# Patient Record
Sex: FEMALE | Race: WHITE | NOT HISPANIC OR LATINO
[De-identification: names, ages, dates, MRNs, and addresses within clinical notes are randomized per-mention and may not be internally consistent; named-entity substitution may affect disease eponyms.]

---

## 2017-06-20 ENCOUNTER — APPOINTMENT (OUTPATIENT)
Dept: MRI IMAGING | Facility: CLINIC | Age: 22
End: 2017-06-20

## 2017-06-20 ENCOUNTER — OUTPATIENT (OUTPATIENT)
Dept: OUTPATIENT SERVICES | Facility: HOSPITAL | Age: 22
LOS: 1 days | End: 2017-06-20
Payer: COMMERCIAL

## 2017-06-20 DIAGNOSIS — Z00.8 ENCOUNTER FOR OTHER GENERAL EXAMINATION: ICD-10-CM

## 2017-06-20 PROCEDURE — 72148 MRI LUMBAR SPINE W/O DYE: CPT

## 2017-07-22 ENCOUNTER — APPOINTMENT (OUTPATIENT)
Dept: ORTHOPEDIC SURGERY | Facility: CLINIC | Age: 22
End: 2017-07-22

## 2017-07-22 DIAGNOSIS — M51.36 OTHER INTERVERTEBRAL DISC DEGENERATION, LUMBAR REGION: ICD-10-CM

## 2018-05-03 ENCOUNTER — HOSPITAL ENCOUNTER (EMERGENCY)
Dept: HOSPITAL 25 - UCCORT | Age: 23
Discharge: HOME | End: 2018-05-03
Payer: COMMERCIAL

## 2018-05-03 DIAGNOSIS — K64.4: Primary | ICD-10-CM

## 2018-05-03 DIAGNOSIS — Z87.891: ICD-10-CM

## 2018-05-03 DIAGNOSIS — K59.00: ICD-10-CM

## 2018-05-03 DIAGNOSIS — R11.2: ICD-10-CM

## 2018-05-03 DIAGNOSIS — Z88.2: ICD-10-CM

## 2018-05-03 PROCEDURE — 99202 OFFICE O/P NEW SF 15 MIN: CPT

## 2018-05-03 PROCEDURE — 82270 OCCULT BLOOD FECES: CPT

## 2018-05-03 PROCEDURE — G0463 HOSPITAL OUTPT CLINIC VISIT: HCPCS

## 2018-05-03 NOTE — UC
Complaint Female HPI





- HPI Summary


HPI Summary: 





24 y/o female presents to the urgent care c/o bright red blood in the stool she 

noticed 4 days ago after a bowel movement which resolved today.  Pt reports 

lately she she has been constipated  w/ hard stool every other day.  She has 

been taking Percocet PO for a herniated disc in her back since 4/24/2018 and 

she thinks this has made her bowel movement harder.  She also has PMHX of 

Migraine HA which was triggered this morning and then she had 3 episodes of 

vomiting.  She took and Excedrin PO and Zofran PO around 1530PM and Migraine 

and vomiting has resolved tonight.  LMP:4/23/2018 w/ regular menstrual cycles. 

Pt stays Pain is 4/10 w/ a BM at times. Pt deies fever, abdominal pain, 

hematoemesis, Urinary symptoms, vaginal D/C, Hx of STD's. 








- History Of Current Complaint


Chief Complaint: UCGeneralIllness


Stated Complaint: BLOOD IN STOOL,VOMITING


Time Seen by Provider: 05/03/18 21:50


Hx Obtained From: Patient


Onset/Duration: Gradual Onset, Lasting Days - 3 days, Resolved, Worse Since - 

yesterday


Timing: Intermittent


Severity Initially: Mild


Severity Currently: Mild


Pain Intensity: 4


Pain Scale Used: 0-10 Numeric


Character: Sharp


Aggravating Factor(s): Other - bowel movement


Alleviating Factor(s): Nothing


Associated Signs And Symptoms: Negative: Fever, Back Pain, Vaginal Bleeding/

Discharge, Vaginal Discharge





- Risk Factors


Ectopic Pregnancy Risk Factor: Maternal Age ^ 31


Ovarian Torsion Risk Factor: Negative





- Allergies/Home Medications


Allergies/Adverse Reactions: 


 Allergies











Allergy/AdvReac Type Severity Reaction Status Date / Time


 


Sulfa (Sulfonamide Allergy  Rash Verified 05/03/18 21:31





Antibiotics)     











Home Medications: 


 Home Medications





Ondansetron ODT TAB* [Zofran 4 MG Odt TAB*] 4 mg PO Q6H PRN 05/03/18 [History 

Confirmed 05/03/18]


Senna TAB* [Senokot TAB*] 2 tab PO DAILY 05/03/18 [History Confirmed 05/03/18]


oxyCODONE/Acetamin 5/325 MG* [Percocet 5/325 TAB*] 0.5 tab PO Q4H PRN 05/03/18 [

History Confirmed 05/03/18]











PMH/Surg Hx/FS Hx/Imm Hx


Previously Healthy: Yes


Neurological History: Migraine


Other Neurological History: lumbar herniated discs





- Surgical History


Surgical History: None





- Family History


Known Family History: Positive: Diabetes





- Social History


Occupation: Employed Full-time


Lives: With Family


Alcohol Use: Rare


Substance Use Type: Prescribed


Smoking Status (MU): Never Smoked Tobacco





Review of Systems


Constitutional: Negative


Skin: Negative


Eyes: Negative


ENT: Negative


Respiratory: Negative


Cardiovascular: Negative


Gastrointestinal: Other - rectal pain w/ blood in the stool which resolved today


Genitourinary: Negative


Motor: Negative


Neurovascular: Negative


Musculoskeletal: Negative


Neurological: Negative


Psychological: Negative


Is Patient Immunocompromised?: No


All Other Systems Reviewed And Are Negative: Yes





Physical Exam





- Summary


Physical Exam Summary: 





Vital Signs Reviewed: Yes


General:Patient is a well developed and nourished obese female who is sitting 

comfortable in the examining table.   Patient is not in any acute respiratory 

distress. 


Eyes: Positive: Conjunctiva Clear - PERRLA, EOMI, fundi grossly normal


ENT: Positive: Normal ENT inspection, Hearing grossly normal, Pharynx normal, 

TMs normal


Neck: Positive: Supple, Nontender, No Lymphadenopathy


Respiratory: Positive: Chest non-tender, Lungs clear, Normal breath sounds, No 

respiratory distress


Cardiovascular: Positive: RRR,S1 and S2 present, No Murmur, Pulses Normal, 

Brisk Capillary Refill


Abdomen Description: Positive: Nontender,  Flat with no distention. No surface 

trauma, scars, incisions.  normal bowel sounds  present in all four quadrants.  

No tenderness, guarding, rigidity to palpation. No masses palpated, no 

pulsation in epigastric area.  No organomegaly.  Negative Newton signs.  No 

periumbilical tenderness.  No rebound in the lower quadrants.  NT over 

[place] point.   Good femoral pulses bilaterally.  No hernia noted. No CVAT 

bilaterally


Rectal:Sphincter tone normal.  No rectal wall tenderness.  Stool is brown and 

heme negative.  Positive small external hemorrhoid around 6 o'clock, mild 

tender to palpation, no bleeding observed.


Musculoskeletal: Positive: Strength Intact, ROM Intact, No Edema,FROM in all 

major joints, no edema, no cyanosis or clubbing.


Neuro: Alert and oriented x 3. No acute neurological deficits. Speech is normal.


Psychological: WNL


Skin: Dry and warm








Triage Information Reviewed: Yes


Vital Signs: 


 Initial Vital Signs











Temp  97.9 F   05/03/18 21:25


 


Pulse  89   05/03/18 21:25


 


Resp  16   05/03/18 21:25


 


BP  125/85   05/03/18 21:25


 


Pulse Ox  99   05/03/18 21:25














 Complaint Female Dx





- Course


Course Of Treatment: 24 y/o female presents to the urgent care c/o bright red 

blood in the stool she noticed 4 days ago after a bowel movement which resolved 

today.  Pt reports lately she she has been constipated  w/ hard stool every 

other day.  She has been taking Percocet PO for a herniated disc in her back 

since 4/24/2018 and she thinks this has made her bowel movement harder.  She 

also has PMHX of Migraine HA which was triggered this morning and then she had 

3 episodes of vomiting.  She took and Excedrin PO and Zofran PO around 1530PM 

and Migraine and vomiting has resolved tonight.  LMP:4/23/2018 w/ regular 

menstrual cycles. Pt stays Pain is 4/10 w/ a BM at times. Pt deies fever, 

abdominal pain, hematoemesis, Urinary symptoms, vaginal D/C, Hx of STD's.Hx 

obtained. PE: Pt w/Rectal:Sphincter tone normal.  No rectal wall tenderness.  

Stool is brown and heme negative.  Positive small external hemorrhoid around 6 o

'clock, mild tender to palpation, no bleeding observed on examination.  Pt Rx 

Anusol-HC 25 mg rectal suppository and Miralax and Tucs as directed below. PT 

Advised to increase fluid intake,  dietary management for  constipation. 

Advised to f/u w/ GI DR Montiel if not improvement of symptoms in 1 week. D/C 

instructions explained. Pt understood and agreed w/ plan of care.Pt left the 

clinic hemodynamically stable, A&OX3





- Differential Dx/Diagnosis


Differential Diagnosis/HQI/PQRI: Pelvic Inflammatory Disease, Urinary Tract 

Infection, Other - hemorrhoids, anal fistula, gastritis, ulcers,


Provider Diagnoses: 1- external hemorrhoids.  2- Constipation.  3-Nausea and 

vomiting





Discharge





- Sign-Out/Discharge


Documenting (check all that apply): Discharge/Admit/Transfer - D/C home





- Discharge Plan


Condition: Stable


Disposition: HOME


Prescriptions: 


Hydrocortisone SUPP* [Anusol HC Supp*] 25 mg .SEE ORDER BID #20 supp


Polyethylene Glycol 3350* [Miralax*] 17 gm PO DAILY #1 bottle


Witch Hazel PAD* [Tucks*] 1 applic TOPICAL SEE INSTRUCTIONS #1 jar


Patient Education Materials:  Constipation (ED), Hemorrhoids (ED), Acute Nausea 

and Vomiting (ED)


Referrals: 


[facility] [place] [facility] [Outside] - 3 Days


Jeremie Montiel MD [Medical Doctor] - If Needed


Additional Instructions: 


1-Please apply medications  as directed


2- Star dietary management of constipation: increase fruits, vegetables, fluid 

and fiber. 1-2 tablespoons qd-tid of equal parts applesauce, prune juice and 

oat bran. 


3- Take Miralax PO as directed to alleviate constipation


4- Continue taking the Zofran PO to alleviate nausea and vomiting. Increase 

fluid intake and rest. 











- Billing Disposition and Condition


Condition: STABLE


Disposition: HOME